# Patient Record
Sex: FEMALE | Race: WHITE | NOT HISPANIC OR LATINO | URBAN - METROPOLITAN AREA
[De-identification: names, ages, dates, MRNs, and addresses within clinical notes are randomized per-mention and may not be internally consistent; named-entity substitution may affect disease eponyms.]

---

## 2018-08-03 RX ORDER — SULFASALAZINE 500 MG/1
TABLET ORAL
COMMUNITY
End: 2019-11-07 | Stop reason: ALTCHOICE

## 2018-08-03 RX ORDER — ROSUVASTATIN CALCIUM 5 MG/1
TABLET, COATED ORAL
COMMUNITY

## 2018-08-03 RX ORDER — AA/PROT/LYSINE/METHIO/VIT C/B6 50-12.5 MG
TABLET ORAL
COMMUNITY

## 2018-08-06 ENCOUNTER — OFFICE VISIT (OUTPATIENT)
Dept: OBSTETRICS AND GYNECOLOGY | Facility: CLINIC | Age: 83
End: 2018-08-06
Payer: MEDICARE

## 2018-08-06 VITALS
HEIGHT: 62 IN | SYSTOLIC BLOOD PRESSURE: 120 MMHG | BODY MASS INDEX: 24.48 KG/M2 | DIASTOLIC BLOOD PRESSURE: 84 MMHG | WEIGHT: 133 LBS

## 2018-08-06 DIAGNOSIS — Z12.39 BREAST CANCER SCREENING: ICD-10-CM

## 2018-08-06 DIAGNOSIS — Z01.411 ENCOUNTER FOR GYNECOLOGICAL EXAMINATION (GENERAL) (ROUTINE) WITH ABNORMAL FINDINGS: Primary | ICD-10-CM

## 2018-08-06 DIAGNOSIS — Z01.419 ENCOUNTER FOR GYNECOLOGICAL EXAMINATION WITHOUT ABNORMAL FINDING: ICD-10-CM

## 2018-08-06 PROCEDURE — G0101 CA SCREEN;PELVIC/BREAST EXAM: HCPCS | Mod: GA | Performed by: OBSTETRICS & GYNECOLOGY

## 2018-08-06 RX ORDER — ACETAMINOPHEN 500 MG
TABLET ORAL
COMMUNITY

## 2018-08-06 RX ORDER — SULFASALAZINE 500 MG/1
500 TABLET ORAL
COMMUNITY
End: 2018-08-06 | Stop reason: SDUPTHER

## 2018-08-06 RX ORDER — ROSUVASTATIN CALCIUM 5 MG/1
TABLET, COATED ORAL
COMMUNITY
Start: 2014-03-02 | End: 2018-08-06 | Stop reason: SDUPTHER

## 2018-08-06 RX ORDER — RALOXIFENE HYDROCHLORIDE 60 MG/1
60 TABLET, FILM COATED ORAL
COMMUNITY
End: 2018-08-06 | Stop reason: ENTERED-IN-ERROR

## 2018-08-06 RX ORDER — HYDROGEN PEROXIDE 3 %
SOLUTION, NON-ORAL MISCELLANEOUS
COMMUNITY
End: 2018-08-06 | Stop reason: ENTERED-IN-ERROR

## 2018-08-06 RX ORDER — MULTIVITAMIN
1 TABLET ORAL
COMMUNITY
End: 2018-08-06 | Stop reason: ENTERED-IN-ERROR

## 2018-08-06 NOTE — PROGRESS NOTES
Patient ID: Carol Ann Villafuerte   : 1935 82 y.o.  MRN: [id number]   Visit Date: 2018    Subjective   Carol Ann Villafuerte is presenting today for Gynecologic Exam      Patient presents for an annual GYN exam.  She notes occasional lower abdominal and pelvic pain, more on the left than the right.  Seems to be intermittent without obvious triggering factors.  She denies any vaginal bleeding.            Past Medical History:  has a past medical history of Hyperlipemia; IBS (irritable bowel syndrome); Osteoporosis; and Ulcerative (chronic) enterocolitis (CMS/HCC) (HCC).     Past Surgical History:  has a past surgical history that includes Eye surgery (); Breast biopsy (Bilateral); and Oophorectomy (Left, ).    Obstetric History:   OB History    Para Term  AB Living   2 2 1 1       SAB TAB Ectopic Multiple Live Births           1      # Outcome Date GA Lbr Burke/2nd Weight Sex Delivery Anes PTL Lv   2  68 35w0d  3.629 kg F Vag-Spont   ND   1 Term 62 38w0d  2.977 kg F Vag-Spont             Family History: family history includes Breast cancer in her maternal grandmother; Colon cancer in her father, mother, and other; Diabetes in her father and paternal grandmother; Stroke in her mother.    Social History:  reports that she has quit smoking. She has never used smokeless tobacco. Alcohol use questions deferred to the physician. She reports that she does not use drugs.    Medications:   Current Outpatient Prescriptions:   •  cholecalciferol, vitamin D3, 2,000 unit capsule, Take by mouth., Disp: , Rfl:   •  coenzyme Q10 (CO Q-10) 10 mg capsule, No SIG Entered, Disp: , Rfl:   •  denosumab (PROLIA) 60 mg/mL syringe, inject 1 milliliter (60 mg) by subcutaneous route every 6 months in the upper arm, upper thigh or abdomen, Disp: , Rfl:   •  multivitamin capsule, No SIG Entered, Disp: , Rfl:   •  rosuvastatin (CRESTOR) 5 mg tablet, No SIG Entered, Disp: , Rfl:   •  sulfaSALAzine  "(AZULFIDINE) 500 mg tablet, take 1 tablet (500 mg) by oral route 4 times per day after meals, Disp: , Rfl:   •  ascorbic acid-bioflavonoids 1,000-100 mg tablet extended release, Take by mouth., Disp: , Rfl:   •  psyllium husk (METAMUCIL ORAL), No SIG Entered, Disp: , Rfl:       Allergies: is allergic to no known allergies.     Review of Systems  Constitutional:   No hot flashes.   No night sweats.   No unexpected weight changes.  HENT:   No oral ulcers.   No headaches related to menstrual cycle.   Breasts:   No changes to skin of the breast or nipple.   No nipple discharge.   No breast lumps/cysts.   No breast pain.   Patient has not noticed any changes to breasts.   Respiratory:   No shortness of breath.  Cardiovascular:   No chest pain  Gastrointestinal:   No changes in bowel habits.  Genitourinary:   No pain with urination.   No urgency with urination.   No increased frequency of urination.   No urinary incontinence.   No vaginal discharge.   No painful intercourse.   No genital sores.   No irregular menstrual cycles.   No heavy menstrual cycles.   No painful menstrual periods.   No bleeding between menstrual cycles.   No bleeding after intercourse.  No absence of menstrual periods.  Integument:   No changes to any existing genital skin lesions or moles.   No new vaginal skin lesions or moles.   No genital rashes.   Endocrine:   No increased hair growth   Psychiatric:   No anxiety.   No depression.   No suicidal ideation.   Heme-Lymph:   No easy bruising.   No unexplained lumps.          Vital Signs for this encounter: /84   Ht 1.575 m (5' 2\")   Wt 60.3 kg (133 lb)   BMI 24.33 kg/m²     OBGyn Exam    General Appearance: Alert, cooperative, no acute distress  Head: Normocephalic, without obvious abnormality  Breast: No tenderness, masses, skin changes, adenopathy, or nipple discharge.  Abdomen: Soft, nontender, nondistended, no masses, no organomegaly  Genitalia:   External genitalia: Moderate atrophic " changes without rashes or lesions seen.    Vagina: Moderate atrophic changes, introitus admits one finger, no blood or discharge seen.    Cervix: Deep, no lesions seen.    Uterus: Mid position, small, difficult to palpate.    Adnexa no palpable masses or tenderness.      Extremities: no edema    Impression/Plan:    Normal GYN exam    Lower abdominal/pelvic discomfort, intermittent, etiology unclear.    Mammography ordered    Patient's complaints discussed.  If the increase in frequency, severity or duration she will contact the office and a pelvic ultrasound will be ordered.  The patient feels comfortable with this plan.    NAB

## 2018-08-13 ENCOUNTER — TELEPHONE (OUTPATIENT)
Dept: OBSTETRICS AND GYNECOLOGY | Facility: CLINIC | Age: 83
End: 2018-08-13

## 2018-08-13 DIAGNOSIS — R92.8 ABNORMAL MAMMOGRAM: Primary | ICD-10-CM

## 2018-08-13 NOTE — TELEPHONE ENCOUNTER
[facility] called stating that pt needs diagnostic left mammogram and u/s. Order placed and faxed to facility at [phone].

## 2019-11-07 ENCOUNTER — OFFICE VISIT (OUTPATIENT)
Dept: OBSTETRICS AND GYNECOLOGY | Facility: CLINIC | Age: 84
End: 2019-11-07
Payer: MEDICARE

## 2019-11-07 VITALS
DIASTOLIC BLOOD PRESSURE: 80 MMHG | HEIGHT: 62 IN | WEIGHT: 134 LBS | BODY MASS INDEX: 24.66 KG/M2 | SYSTOLIC BLOOD PRESSURE: 140 MMHG

## 2019-11-07 DIAGNOSIS — Z12.31 BREAST CANCER SCREENING BY MAMMOGRAM: ICD-10-CM

## 2019-11-07 DIAGNOSIS — Z01.419 ENCOUNTER FOR GYNECOLOGICAL EXAMINATION WITHOUT ABNORMAL FINDING: Primary | ICD-10-CM

## 2019-11-07 PROCEDURE — G0101 CA SCREEN;PELVIC/BREAST EXAM: HCPCS | Mod: GA | Performed by: OBSTETRICS & GYNECOLOGY

## 2019-11-07 NOTE — PROGRESS NOTES
Patient ID: Carol Ann Villafuerte   : 1935 83 y.o.  MRN: [id number]   Visit Date: 2019    Subjective   Carol Ann Villafuerte is presenting today for Annual Exam      Patient presents for a routine GYN exam.  She has no complaints at this time.  Several years ago she had a pelvic ultrasound revealing a small amount of endometrial fluid.  She has had no bleeding or other GYN issues. Patient does note intermittent rectal bleeding when she strains.  There is a strong family history of colon cancer and she has other GI issues herself.  Her gastroenterologist has recently retired.  She notes intermittent bloating that may be related to dietary intake.          Past Medical History:  has a past medical history of Breast cancer screening (08/15/2018), Colitis, GERD (gastroesophageal reflux disease), bone density study (2017), Hyperlipemia, IBS (irritable bowel syndrome), IBS (irritable bowel syndrome), Osteoporosis, Pap smear for cervical cancer screening (2016), Screening for colon cancer, and Ulcerative (chronic) enterocolitis (CMS/HCC).     Past Surgical History:  has a past surgical history that includes Oophorectomy (Left, ); Colonoscopy (2015); Eye surgery (); Breast biopsy (Bilateral); and Tonsillectomy.    Obstetric History:   OB History    Para Term  AB Living   2 2 1 1   1   SAB TAB Ectopic Multiple Live Births           2      # Outcome Date GA Lbr Burke/2nd Weight Sex Delivery Anes PTL Lv   2  68 35w0d  3629 g (8 lb) F Vag-Spont   ND   1 Term 62 38w0d  2977 g (6 lb 9 oz) F Vag-Spont   PEPE       Family History: family history includes Breast cancer in her maternal grandmother; Colon cancer in her biological father, biological mother, and mother's sister; Diabetes in her biological brother, biological father, and paternal grandmother; Stroke in her biological mother.    Social History:  reports that she has quit smoking. She has never used smokeless  tobacco. She reports that she drank alcohol. She reports that she does not use drugs.    Medications:   Current Outpatient Medications:   •  ascorbic acid-bioflavonoids 1,000-100 mg tablet extended release, Take by mouth., Disp: , Rfl:   •  cholecalciferol, vitamin D3, 2,000 unit capsule, Take by mouth., Disp: , Rfl:   •  coenzyme Q10 (CO Q-10) 10 mg capsule, No SIG Entered, Disp: , Rfl:   •  denosumab (PROLIA) 60 mg/mL syringe, inject 1 milliliter (60 mg) by subcutaneous route every 6 months in the upper arm, upper thigh or abdomen, Disp: , Rfl:   •  multivitamin capsule, No SIG Entered, Disp: , Rfl:   •  psyllium husk (METAMUCIL ORAL), No SIG Entered, Disp: , Rfl:   •  rosuvastatin (CRESTOR) 5 mg tablet, No SIG Entered, Disp: , Rfl:       Allergies: is allergic to no known allergies.     Review of Systems  Constitutional:   No hot flashes.   No night sweats.   No unexpected weight changes.  HENT:   No oral ulcers.   No headaches related to menstrual cycle.   Breasts:   No changes to skin of the breast or nipple.   No nipple discharge.   No breast lumps/cysts.   No breast pain.   Patient has not noticed any changes to breasts.   Respiratory:   No shortness of breath.  Cardiovascular:   No chest pain  Gastrointestinal:   No changes in bowel habits.  Genitourinary:   No pain with urination.   No urgency with urination.   No increased frequency of urination.   No urinary incontinence.   No vaginal dryness.  No vaginal discharge.   No painful intercourse.   No genital sores.   No irregular menstrual cycles.   No heavy menstrual cycles.   No painful menstrual periods.   No bleeding between menstrual cycles.   No bleeding after intercourse.  No absence of menstrual periods.  Integument:   No changes to any existing genital skin lesions or moles.   No new vaginal skin lesions or moles.   No genital rashes.   Endocrine:   No increased hair growth   Psychiatric:   No anxiety.   No depression.   No suicidal  "ideation.  Patient does not have concerns for her safety.   Heme-Lymph:   No easy bruising.   No unexplained lumps.           Vital Signs for this encounter:   Visit Vitals  /80   Ht 1.562 m (5' 1.5\")   Wt 60.8 kg (134 lb)   LMP  (LMP Unknown)   BMI 24.91 kg/m²       OBGyn Exam    General Appearance: Alert, cooperative, no acute distress  Head: Normocephalic, without obvious abnormality  Breast: No tenderness, masses, skin changes, adenopathy, or nipple discharge.  Abdomen: Soft, nontender, nondistended, no masses, no organomegaly  Genitalia:   External genitalia: Moderate atrophic changes without rashes or lesions seen.    Vagina: Moderate atrophic changes, admits one finger, no blood or discharge seen.    Cervix: No lesions seen, no cervical motion tenderness.    Uterus: Mid position, small, nontender.    Adnexa: No palpable masses or tenderness.      Extremities: no edema    Impression/Plan:    Normal GYN exam    Mammogram ordered.    Suggested GI consult regarding her history of rectal bleeding plus her intermittent bloating plus her family history.  Patient speak with her PCP about a referral closer to where she lives.  If this is unsuccessful, she can call here for referral in this area.    Return 1 to 2 years at patient's preference.    NAB  "

## 2019-11-26 DIAGNOSIS — Z12.31 BREAST CANCER SCREENING BY MAMMOGRAM: ICD-10-CM

## 2020-11-04 ENCOUNTER — TELEPHONE (OUTPATIENT)
Dept: OBSTETRICS AND GYNECOLOGY | Facility: CLINIC | Age: 85
End: 2020-11-04

## 2020-11-09 DIAGNOSIS — Z12.31 BREAST CANCER SCREENING BY MAMMOGRAM: Primary | ICD-10-CM

## 2020-11-09 NOTE — PROGRESS NOTES
Pt called would like amammo slip mailed to her home.  Pt has a gyn appt in 12/2020.    Order in epic.    MMB

## 2020-12-05 DIAGNOSIS — Z12.31 BREAST CANCER SCREENING BY MAMMOGRAM: ICD-10-CM

## 2021-02-04 ENCOUNTER — IMMUNIZATIONS (OUTPATIENT)
Dept: FAMILY MEDICINE CLINIC | Facility: HOSPITAL | Age: 86
End: 2021-02-04

## 2021-02-04 DIAGNOSIS — Z23 ENCOUNTER FOR IMMUNIZATION: Primary | ICD-10-CM

## 2021-02-04 PROCEDURE — 91301 SARS-COV-2 / COVID-19 MRNA VACCINE (MODERNA) 100 MCG: CPT

## 2021-02-04 PROCEDURE — 0011A SARS-COV-2 / COVID-19 MRNA VACCINE (MODERNA) 100 MCG: CPT

## 2021-03-04 ENCOUNTER — IMMUNIZATIONS (OUTPATIENT)
Dept: FAMILY MEDICINE CLINIC | Facility: HOSPITAL | Age: 86
End: 2021-03-04

## 2021-03-04 DIAGNOSIS — Z23 ENCOUNTER FOR IMMUNIZATION: Primary | ICD-10-CM

## 2021-03-04 PROCEDURE — 91301 SARS-COV-2 / COVID-19 MRNA VACCINE (MODERNA) 100 MCG: CPT

## 2021-03-04 PROCEDURE — 0012A SARS-COV-2 / COVID-19 MRNA VACCINE (MODERNA) 100 MCG: CPT

## 2021-11-17 ENCOUNTER — OFFICE VISIT (OUTPATIENT)
Dept: OBSTETRICS AND GYNECOLOGY | Facility: CLINIC | Age: 86
End: 2021-11-17
Payer: MEDICARE

## 2021-11-17 VITALS
BODY MASS INDEX: 25.58 KG/M2 | DIASTOLIC BLOOD PRESSURE: 80 MMHG | WEIGHT: 139 LBS | HEIGHT: 62 IN | SYSTOLIC BLOOD PRESSURE: 120 MMHG

## 2021-11-17 DIAGNOSIS — Z01.419 ENCOUNTER FOR GYNECOLOGICAL EXAMINATION WITHOUT ABNORMAL FINDING: Primary | ICD-10-CM

## 2021-11-17 DIAGNOSIS — Z12.31 BREAST CANCER SCREENING BY MAMMOGRAM: ICD-10-CM

## 2021-11-17 PROCEDURE — G0101 CA SCREEN;PELVIC/BREAST EXAM: HCPCS | Mod: GA | Performed by: OBSTETRICS & GYNECOLOGY

## 2021-11-17 RX ORDER — ASPIRIN 81 MG/1
81 TABLET ORAL DAILY
COMMUNITY

## 2021-11-17 RX ORDER — POLYETHYLENE GLYCOL 3350 17 G/17G
17 POWDER, FOR SOLUTION ORAL AS NEEDED
COMMUNITY

## 2021-11-17 NOTE — PROGRESS NOTES
Patient ID: Carol Ann Villafuerte   : 1935 85 y.o.  MRN: [id number]   Visit Date: 2021    Subjective   Carol Ann Villafuerte is presenting today for Annual Exam      Patient presents for routine GYN exam.  She is generally doing well.  She notes occasional urinary urgency and occasional nocturia.  She does not feel that either of these issues require medical intervention.          Past Medical History:  has a past medical history of Breast cancer screening (2020), Colitis, GERD (gastroesophageal reflux disease), GERD (gastroesophageal reflux disease), bone density study (2017), Hyperlipemia, IBS (irritable bowel syndrome), IBS (irritable bowel syndrome), Osteoporosis, Pap smear for cervical cancer screening (2016), Screening for colon cancer, and Ulcerative (chronic) enterocolitis (CMS/HCC).     Past Surgical History:  has a past surgical history that includes Oophorectomy (Left, ); Colonoscopy (2015); Eye surgery (); Breast biopsy (Bilateral); and Tonsillectomy.    Obstetric History:   OB History    Para Term  AB Living   2 2 1 1   1   SAB IAB Ectopic Multiple Live Births           2      # Outcome Date GA Lbr Burke/2nd Weight Sex Delivery Anes PTL Lv   2  68 35w0d  3629 g (8 lb) F Vag-Spont   ND   1 Term 62 38w0d  2977 g (6 lb 9 oz) F Vag-Spont   PEPE       Family History: family history includes Breast cancer in her maternal grandmother; Colon cancer in her biological father, biological mother, and mother's sister; Diabetes in her biological brother, biological father, and paternal grandmother; Stroke in her biological mother.    Social History:  reports that she has quit smoking. She has never used smokeless tobacco. She reports current alcohol use. She reports that she does not use drugs.    Medications:   Current Outpatient Medications:   •  ascorbic acid-bioflavonoids 1,000-100 mg tablet extended release, Take by mouth., Disp: , Rfl:   •   aspirin 81 mg enteric coated tablet, Take 81 mg by mouth daily., Disp: , Rfl:   •  cholecalciferol, vitamin D3, 2,000 unit capsule, Take by mouth., Disp: , Rfl:   •  coenzyme Q10 (CO Q-10) 10 mg capsule, No SIG Entered, Disp: , Rfl:   •  denosumab (PROLIA) 60 mg/mL syringe, inject 1 milliliter (60 mg) by subcutaneous route every 6 months in the upper arm, upper thigh or abdomen, Disp: , Rfl:   •  multivitamin capsule, No SIG Entered, Disp: , Rfl:   •  omeprazole/sodium bicarbonate (ZEGERID ORAL), Take by mouth., Disp: , Rfl:   •  polyethylene glycol 17 gram packet, Take 17 g by mouth as needed., Disp: , Rfl:   •  rosuvastatin (CRESTOR) 5 mg tablet, No SIG Entered, Disp: , Rfl:       Allergies: is allergic to no known allergies.     Review of Systems  Constitutional:   No hot flashes.   No night sweats.   No unexpected weight changes.  HENT:   No oral ulcers.   No headaches related to menstrual cycle.   Breasts:   No changes to skin of the breast or nipple.   No nipple discharge.   No breast lumps/cysts.   No breast pain.   Patient has not noticed any changes to breasts.   Respiratory:   No shortness of breath.  Cardiovascular:   No chest pain  Gastrointestinal:   No changes in bowel habits.  Genitourinary:   No pain with urination.   occ urgency with urination.   No increased frequency of urination.   No urinary incontinence.   No vaginal dryness.  No vaginal discharge.   No painful intercourse.   No genital sores.   No irregular menstrual cycles.   No heavy menstrual cycles.   No painful menstrual periods.   No bleeding between menstrual cycles.   No bleeding after intercourse.  No absence of menstrual periods.  Integument:   No changes to any existing genital skin lesions or moles.   No new vaginal skin lesions or moles.   No genital rashes.   Endocrine:   No increased hair growth   Psychiatric:   No anxiety.   No depression.   No suicidal ideation.  Patient does not have concerns for her safety.   Heme-Lymph:  "  No easy bruising.   No unexplained lumps.           Vital Signs for this encounter:   Visit Vitals  /80   Ht 1.575 m (5' 2\")   Wt 63 kg (139 lb)   LMP  (LMP Unknown)   BMI 25.42 kg/m²       OBGyn Exam    General Appearance: Alert, cooperative, no acute distress  Head: Normocephalic, without obvious abnormality  Breast: No tenderness, masses, skin changes, adenopathy, or nipple discharge.  Abdomen: Soft, nontender, nondistended, no masses, no organomegaly  Genitalia:   External genitalia: Moderate atrophic changes without rashes or lesions seen.    Vagina: Moderate atrophic changes, introitus admits 1 finger, no blood or discharge seen.    Cervix: No lesions seen.    Uterus: Mid to anterior position, small.    Adnexa: No palpable masses or tenderness.      Extremities: no edema    Impression/Plan:    Normal GYN exam    Mammogram ordered.    Return 1 to 2 years at patient's preference.    NAB  "

## 2022-11-18 ENCOUNTER — TELEPHONE (OUTPATIENT)
Dept: OBSTETRICS AND GYNECOLOGY | Facility: CLINIC | Age: 87
End: 2022-11-18
Payer: MEDICARE

## 2022-11-18 DIAGNOSIS — Z12.31 BREAST CANCER SCREENING BY MAMMOGRAM: Primary | ICD-10-CM

## 2022-11-18 NOTE — TELEPHONE ENCOUNTER
Last Mammo: approximate date 12/2020 and was normal  Last OV: 11/17/2021  Next OV: 11/14/2023    Mammo ordered per pt request.  Katherine Sprague RN

## 2023-11-22 ENCOUNTER — TELEPHONE (OUTPATIENT)
Dept: OBSTETRICS AND GYNECOLOGY | Facility: CLINIC | Age: 88
End: 2023-11-22

## 2023-11-22 NOTE — TELEPHONE ENCOUNTER
Spoke to patient and explained that her appt with Dr Galvan has to be rescheduled due to him being in the OR. Patient was ok with it and I told her that I would call her back after I spoke to the nurse manager and see where we can bring in for her appt.

## 2023-12-11 ENCOUNTER — OFFICE VISIT (OUTPATIENT)
Dept: OBSTETRICS AND GYNECOLOGY | Facility: CLINIC | Age: 88
End: 2023-12-11
Payer: MEDICARE

## 2023-12-11 VITALS
HEIGHT: 62 IN | DIASTOLIC BLOOD PRESSURE: 78 MMHG | WEIGHT: 122.6 LBS | SYSTOLIC BLOOD PRESSURE: 130 MMHG | BODY MASS INDEX: 22.56 KG/M2

## 2023-12-11 DIAGNOSIS — Z01.419 ENCOUNTER FOR GYNECOLOGICAL EXAMINATION WITHOUT ABNORMAL FINDING: Primary | ICD-10-CM

## 2023-12-11 DIAGNOSIS — Z12.31 BREAST CANCER SCREENING BY MAMMOGRAM: ICD-10-CM

## 2023-12-11 PROCEDURE — G0101 CA SCREEN;PELVIC/BREAST EXAM: HCPCS | Mod: GA | Performed by: OBSTETRICS & GYNECOLOGY

## 2023-12-11 NOTE — PROGRESS NOTES
Patient ID: Carol Ann Villafuerte   : 1935 88 y.o.  MRN: [id number]   Visit Date: 2023    Subjective   Carol Ann Villafuerte is presenting today for Routine Prenatal Visit      Patient presents for an annual GYN exam.  She notes urinary frequency and nocturia.  She has a limited fluid intake in the evenings.  She does not feel treatment is indicated at this time.  She has issues with chronic constipation and uses MiraLAX for this.  Per the patient, she had a normal mammogram in 2023 at [facility].          Past Medical History:  has a past medical history of Breast cancer screening (2023), Colitis, GERD (gastroesophageal reflux disease), GERD (gastroesophageal reflux disease), bone density study (2017), Hyperlipemia, IBS (irritable bowel syndrome), IBS (irritable bowel syndrome), Osteoporosis, Pap smear for cervical cancer screening (2016), Screening for colon cancer, Stroke (CMS/[facility]), and Ulcerative (chronic) enterocolitis (CMS/[facility]).     Past Surgical History:  has a past surgical history that includes Oophorectomy (Left, ); Colonoscopy (2015); Eye surgery (); Breast biopsy (Bilateral); and Tonsillectomy.    Obstetric History:   OB History    Para Term  AB Living   2 2 1 1   1   SAB IAB Ectopic Multiple Live Births           2      # Outcome Date GA Lbr Burke/2nd Weight Sex Delivery Anes PTL Lv   2  68 35w0d  3629 g (8 lb) F Vag-Spont   ND   1 Term 62 38w0d  2977 g (6 lb 9 oz) F Vag-Spont   PEPE       Family History: family history includes Breast cancer in her maternal grandmother; Colon cancer in her biological father, biological mother, and mother's sister; Diabetes in her biological brother, biological father, and paternal grandmother; Stroke in her biological mother.    Social History:   Social History     Tobacco Use   • Smoking status: Former   • Smokeless tobacco: Never   Substance Use Topics   • Alcohol use: Yes      Comment: rare   • Drug use: No       Medications:   Current Outpatient Medications:   •  ascorbic acid (VITAMIN C ORAL), Take by mouth., Disp: , Rfl:   •  ascorbic acid-bioflavonoids 1,000-100 mg tablet extended release, Take by mouth., Disp: , Rfl:   •  aspirin 81 mg enteric coated tablet, Take 81 mg by mouth daily., Disp: , Rfl:   •  cholecalciferol, vitamin D3, 2,000 unit capsule, Take by mouth., Disp: , Rfl:   •  coenzyme Q10 (CO Q-10) 10 mg capsule, No SIG Entered, Disp: , Rfl:   •  denosumab (PROLIA) 60 mg/mL syringe, inject 1 milliliter (60 mg) by subcutaneous route every 6 months in the upper arm, upper thigh or abdomen, Disp: , Rfl:   •  omeprazole/sodium bicarbonate (ZEGERID ORAL), Take by mouth., Disp: , Rfl:   •  polyethylene glycol 17 gram packet, Take 17 g by mouth as needed., Disp: , Rfl:   •  rosuvastatin (CRESTOR) 5 mg tablet, No SIG Entered, Disp: , Rfl:       Allergies: is allergic to no known allergies.     Review of Systems  Constitutional:   No hot flashes.   No night sweats.   No unexpected weight changes.  HENT:   No oral ulcers.   No headaches related to menstrual cycle.   Breasts:   No changes to skin of the breast or nipple.   No nipple discharge.   No breast lumps/cysts.   No breast pain.   Patient has not noticed any changes to breasts.   Respiratory:   No shortness of breath.  Cardiovascular:   No chest pain  Gastrointestinal:   + changes in bowel habits.  Genitourinary:   No pain with urination.   No urgency with urination.   + increased frequency of urination.   No urinary incontinence.   No vaginal dryness.  No vaginal discharge.   No painful intercourse.   No genital sores.   No irregular menstrual cycles.   No heavy menstrual cycles.   No painful menstrual periods.   No bleeding between menstrual cycles.   No bleeding after intercourse.  No absence of menstrual periods.  Integument:   No changes to any existing genital skin lesions or moles.   No new vaginal skin lesions or moles.  "  No genital rashes.   Endocrine:   No increased hair growth   Psychiatric:   No anxiety.   No depression.   No suicidal ideation.  Patient does not have concerns for her safety.   Heme-Lymph:   No easy bruising.   No unexplained lumps.           Vital Signs for this encounter:   Visit Vitals  /78 (BP Location: Right upper arm, Patient Position: Sitting)   Ht 1.575 m (5' 2\")   Wt 55.6 kg (122 lb 9.6 oz)   LMP  (LMP Unknown)   BMI 22.42 kg/m²       OBGyn Exam    General Appearance: Alert, cooperative, no acute distress  Head: Normocephalic, without obvious abnormality  Breast: No tenderness, masses, skin changes, adenopathy, or nipple discharge.  Abdomen: Soft, nontender, nondistended, no masses, no organomegaly  Genitalia:   External genitalia: Moderate atrophic changes without rashes or lesions seen.     Vagina: Moderate atrophic changes, introitus admits 1 finger, no blood or discharge seen.     Cervix: No lesions seen.     Uterus: Midposition, small.     Adnexa: No palpable masses or tenderness.    Extremities: no edema    Impression/Plan:    Normal GYN exam    Mammogram ordered.    Return in 1 to 2 years at patient's preference.    NAB  "

## 2025-02-11 ENCOUNTER — TELEPHONE (OUTPATIENT)
Dept: OBSTETRICS AND GYNECOLOGY | Facility: CLINIC | Age: 89
End: 2025-02-11
Payer: MEDICARE

## 2025-02-11 DIAGNOSIS — Z12.31 BREAST CANCER SCREENING BY MAMMOGRAM: Primary | ICD-10-CM

## 2025-02-11 NOTE — PROGRESS NOTES
Pt called for a mammo slip.  Pt has medicare last seen 12.11.2023.  Per Dr. Cole link ordered in [facility].    MMB

## 2025-02-11 NOTE — TELEPHONE ENCOUNTER
Pt is requesting a script for annual screening mammo.    She will have it done at [facility].    Please mail script to pt's home address.  Call pt once script is mailed.      Department: [facility] OB/GYN [facility] E   Clinical Pool: OB/GYN [facility] CLINICAL SUPPORT P   PSR Pool: OB/GYN [facility] PSR P